# Patient Record
Sex: FEMALE | Race: WHITE
[De-identification: names, ages, dates, MRNs, and addresses within clinical notes are randomized per-mention and may not be internally consistent; named-entity substitution may affect disease eponyms.]

---

## 2018-08-24 ENCOUNTER — HOSPITAL ENCOUNTER (EMERGENCY)
Dept: HOSPITAL 31 - C.ER | Age: 23
Discharge: HOME | End: 2018-08-24
Payer: MEDICAID

## 2018-08-24 VITALS — RESPIRATION RATE: 16 BRPM | OXYGEN SATURATION: 100 %

## 2018-08-24 VITALS — SYSTOLIC BLOOD PRESSURE: 108 MMHG | DIASTOLIC BLOOD PRESSURE: 70 MMHG | TEMPERATURE: 98.2 F | HEART RATE: 87 BPM

## 2018-08-24 VITALS — BODY MASS INDEX: 24.1 KG/M2

## 2018-08-24 DIAGNOSIS — K29.70: Primary | ICD-10-CM

## 2018-08-24 LAB
ALBUMIN SERPL-MCNC: 4.6 G/DL (ref 3.5–5)
ALBUMIN/GLOB SERPL: 1.5 {RATIO} (ref 1–2.1)
ALT SERPL-CCNC: 22 U/L (ref 9–52)
AST SERPL-CCNC: 20 U/L (ref 14–36)
BASOPHILS # BLD AUTO: 0.1 K/UL (ref 0–0.2)
BASOPHILS NFR BLD: 0.6 % (ref 0–2)
BUN SERPL-MCNC: 13 MG/DL (ref 7–17)
CALCIUM SERPL-MCNC: 9 MG/DL (ref 8.6–10.4)
EOSINOPHIL # BLD AUTO: 0 K/UL (ref 0–0.7)
EOSINOPHIL NFR BLD: 0.2 % (ref 0–4)
ERYTHROCYTE [DISTWIDTH] IN BLOOD BY AUTOMATED COUNT: 13.4 % (ref 11.5–14.5)
GFR NON-AFRICAN AMERICAN: > 60
HGB BLD-MCNC: 13.7 G/DL (ref 11–16)
LIPASE: 47 U/L (ref 23–300)
LYMPHOCYTE: 9 % (ref 20–40)
LYMPHOCYTES # BLD AUTO: 0.7 K/UL (ref 1–4.3)
LYMPHOCYTES NFR BLD AUTO: 6.3 % (ref 20–40)
MCH RBC QN AUTO: 29.2 PG (ref 27–31)
MCHC RBC AUTO-ENTMCNC: 34.3 G/DL (ref 33–37)
MCV RBC AUTO: 84.9 FL (ref 81–99)
MONOCYTE: 4 % (ref 0–10)
MONOCYTES # BLD: 0.3 K/UL (ref 0–0.8)
MONOCYTES NFR BLD: 2.7 % (ref 0–10)
NEUTROPHILS # BLD: 10.3 K/UL (ref 1.8–7)
NEUTROPHILS NFR BLD AUTO: 87 % (ref 50–75)
NEUTROPHILS NFR BLD AUTO: 90.2 % (ref 50–75)
NRBC BLD AUTO-RTO: 0 % (ref 0–2)
PLATELET # BLD EST: NORMAL 10*3/UL
PLATELET # BLD: 212 K/UL (ref 130–400)
PMV BLD AUTO: 8.7 FL (ref 7.2–11.7)
RBC # BLD AUTO: 4.7 MIL/UL (ref 3.8–5.2)
RBC MORPH BLD: NORMAL
TOTAL CELLS COUNTED BLD: 100
WBC # BLD AUTO: 11.4 K/UL (ref 4.8–10.8)

## 2018-08-24 PROCEDURE — 93005 ELECTROCARDIOGRAM TRACING: CPT

## 2018-08-24 PROCEDURE — 96375 TX/PRO/DX INJ NEW DRUG ADDON: CPT

## 2018-08-24 PROCEDURE — 96361 HYDRATE IV INFUSION ADD-ON: CPT

## 2018-08-24 PROCEDURE — 83690 ASSAY OF LIPASE: CPT

## 2018-08-24 PROCEDURE — 85025 COMPLETE CBC W/AUTO DIFF WBC: CPT

## 2018-08-24 PROCEDURE — 96374 THER/PROPH/DIAG INJ IV PUSH: CPT

## 2018-08-24 PROCEDURE — 80053 COMPREHEN METABOLIC PANEL: CPT

## 2018-08-24 PROCEDURE — 99284 EMERGENCY DEPT VISIT MOD MDM: CPT

## 2018-08-24 NOTE — C.PDOC
History Of Present Illness





<Maria Del Carmen Cotto - Last Filed: 18 19:20>





<Anil Durán DO - Last Filed: 18 23:55>





Patient is a 23 year old female who reports for 1.5 day history of persistent 

nausea and vomiting that started after she had a few alcoholic drinks at a 

family event. She states her symptoms worsened last night and today, stating 

that she feels the need to vomit "every few seconds" and is unable to keep 

water or bread down. She states she feels lightheaded, weak, nauseated. She 

started her period today, and states she is not likely to be pregnant. She 

denies constipation, diarrhea, her last bowel movement this morning was normal. 

She denies fevers, chills, chest pain, shortness of breath, abdominal pain, 

dysuria, urinary frequency, leg pain. 


 (Maria Del Carmen Cotto)





<Maria Del Carmen Cotto - Last Filed: 18 19:20>





<Anil Durán DO - Last Filed: 18 23:55>


Chief Complaint (Nursing): GI Problem





Past Medical History





- Medical History


PMH: Gastritis (history of gastritis a few years ago, after she ate acidic foods

)


Surgical History: Appendectomy,  (for placenta previa)


Family History: States: Unknown Family Hx





- Social History


Hx Tobacco Use: No


Hx Alcohol Use: Yes


Hx Substance Use: No





- Immunization History


Hx Tetanus Toxoid Vaccination: No


Hx Influenza Vaccination: No


Hx Pneumococcal Vaccination: No





<Maria Del Carmen Cotto - Last Filed: 18 19:20>


Vital Signs: 





 Last Vital Signs











Temp  98.2 F   18 15:16


 


Pulse  87   18 15:16


 


Resp  16   18 15:16


 


BP  108/70   18 15:16


 


Pulse Ox  100   18 19:21














Review Of Systems


Constitutional: Positive for: Weakness.  Negative for: Fever, Chills


Cardiovascular: Positive for: Light Headedness.  Negative for: Chest Pain, 

Palpitations


Respiratory: Negative for: Shortness of Breath


Gastrointestinal: Positive for: Nausea, Vomiting.  Negative for: Abdominal Pain

, Diarrhea, Constipation


Genitourinary: Negative for: Dysuria, Frequency


Skin: Negative for: Rash





<Maria Del Carmen Cotto - Last Filed: 18 19:20>





Physical Exam





- Physical Exam


Appears: Non-toxic, No Acute Distress


Skin: Warm, Dry, No Pale


Head: Atraumatic, Normacephalic


Eye(s): bilateral: EOMI


Oral Mucosa: Dry


Throat: Normal


Cardiovascular: Rhythm Regular, No Murmur


Respiratory: Normal Breath Sounds, No Accessory Muscle Use, No Rales, No Rhonchi

, No Stridor, No Wheezing


Gastrointestinal/Abdominal: Bowel Sounds, Soft, No Tenderness, No Organomegaly, 

No Mass, No Distention, No Guarding, No Rebound, No Ascites


Back: Normal Inspection, No CVA Tenderness, No Vertebral Tenderness, No 

Paraspinal Tenderness


Extremity: No Tenderness, No Pedal Edema, No Calf Tenderness, Other


Pulses: Right Femoral: Normal, Left Dorsalis Pedis: Normal


Neurological/Psych: Oriented x3





<Maria Del Carmen Cotto - Last Filed: 18 19:20>





ED Course And Treatment





- Laboratory Results


Result Diagrams: 


 18 14:04





 18 14:04


Interpretation Of ECG: NSR at 84, normal axis, no ST-T wave changes.


O2 Sat by Pulse Oximetry: 100


Progress Note: On re-evaluation, patient states she feels much better. She 

states she has not vomited in over an hour. Abdomen soft, nontender.


Reevaluation Time: 14:45


Reassessment Condition: Improved





<Maria Del Carmen Cotto - Last Filed: 18 19:20>





- Laboratory Results


Result Diagrams: 


 18 14:04





 18 14:04





<Anil Durán DO - Last Filed: 18 23:55>





Medical Decision Making





<Maria Del Carmen Cotto - Last Filed: 18 19:20>





<Anil Durán DO - Last Filed: 18 23:55>


Medical Decision Making: 





CBC, CMP, lipase were within normal limits. Urine pregnancy test negative. 


Patient treated with NS IV fluids, Zofran 8mg IV, GI cocktail - Maalox/viscous 

lidocaine/Mylanta/Donnatol with improvement of symptoms. 


1445: On re-evaluation, patient states she feels much better. Abdomen soft, 

nontender. She states she has not had an episode of vomiting in over an hour. 


On discharge, patient remained afebrile, was normotensive, not tachycardic and 

oxygenating well.  (Maria Del Carmen Cotto)





Disposition





- Disposition


Disposition Time: 15:50





<Maria Del Carmen Cotto - Last Filed: 18 19:20>





- Disposition


Disposition Time: 14:45





<Anil Durán DO - Last Filed: 18 23:55>





- Disposition


Referrals: 


[facility], [Non-Staff] - 


Disposition: HOME/ ROUTINE


Condition: IMPROVED


Additional Instructions: 





LIBORIO BA, thank you for letting us take care of you today. The emergency 

medical care you received today was directed at your acute symptoms. If you 

were prescribed any medication, please fill it and take as directed. It may 

take several days for your symptoms to resolve. Return to the Emergency 

Department if your symptoms worsen, do not improve, or if you have any other 

problems.





Please contact your doctor or call one of the physicians/clinics you have been 

referred to that are listed on the Patient Visit Information form that is 

included in your discharge packet. Bring any paperwork you were given at 

discharge with you along with any medications you are taking to your follow up 

visit. Our treatment cannot replace ongoing medical care by a primary care 

provider outside of the emergency department.





Thank you for allowing the [facility] team to be part of your care today.














Follow up with your primary care doctor in 2-3 days for re-evaluation and 

further management.


Prescriptions: 


Ranitidine HCl [Zantac] 150 mg PO BID #20 tablet


Instructions:  Gastritis (DC)


Forms:  [facility] (English)





- Clinical Impression


Clinical Impression: 


 Gastritis, Gastritis








- PA / NP / Resident Statement


JAYJAY has reviewed & agrees with the documentation as recorded.


JAYJAY has examined the patient and agrees with the treatment plan.





<Anil Durán DO - Last Filed: 18 23:55>

## 2018-08-24 NOTE — CARD
--------------- APPROVED REPORT --------------





Date of service: 08/24/2018



EKG Measurement

Heart [id number]

[id number]

[id number]

[id number]

[id number]



<Conclusion>

Normal sinus rhythm

Prolonged QT

Abnormal ECG